# Patient Record
Sex: FEMALE | Race: WHITE | URBAN - METROPOLITAN AREA
[De-identification: names, ages, dates, MRNs, and addresses within clinical notes are randomized per-mention and may not be internally consistent; named-entity substitution may affect disease eponyms.]

---

## 2017-04-26 ENCOUNTER — OFFICE VISIT (OUTPATIENT)
Dept: FAMILY MEDICINE | Facility: CLINIC | Age: 27
End: 2017-04-26
Payer: COMMERCIAL

## 2017-04-26 VITALS
BODY MASS INDEX: 24.4 KG/M2 | DIASTOLIC BLOOD PRESSURE: 72 MMHG | SYSTOLIC BLOOD PRESSURE: 117 MMHG | OXYGEN SATURATION: 100 % | HEART RATE: 69 BPM | HEIGHT: 68 IN | WEIGHT: 161 LBS | TEMPERATURE: 98.3 F

## 2017-04-26 DIAGNOSIS — B00.1 RECURRENT COLD SORES: ICD-10-CM

## 2017-04-26 DIAGNOSIS — L71.0 PERIORAL DERMATITIS: Primary | ICD-10-CM

## 2017-04-26 DIAGNOSIS — Z86.39 HISTORY OF HYPOTHYROIDISM: ICD-10-CM

## 2017-04-26 PROCEDURE — 36415 COLL VENOUS BLD VENIPUNCTURE: CPT | Performed by: PHYSICIAN ASSISTANT

## 2017-04-26 PROCEDURE — 99213 OFFICE O/P EST LOW 20 MIN: CPT | Performed by: PHYSICIAN ASSISTANT

## 2017-04-26 PROCEDURE — 84439 ASSAY OF FREE THYROXINE: CPT | Performed by: PHYSICIAN ASSISTANT

## 2017-04-26 PROCEDURE — 84443 ASSAY THYROID STIM HORMONE: CPT | Performed by: PHYSICIAN ASSISTANT

## 2017-04-26 RX ORDER — LEVOTHYROXINE SODIUM 50 UG/1
50 TABLET ORAL DAILY
COMMUNITY
Start: 2016-03-10 | End: 2017-04-26

## 2017-04-26 RX ORDER — VALACYCLOVIR HYDROCHLORIDE 1 G/1
2000 TABLET, FILM COATED ORAL 2 TIMES DAILY
Qty: 12 TABLET | Refills: 1 | Status: SHIPPED | OUTPATIENT
Start: 2017-04-26 | End: 2017-09-20

## 2017-04-26 RX ORDER — MINOCYCLINE HYDROCHLORIDE 100 MG/1
100 CAPSULE ORAL 2 TIMES DAILY
Qty: 60 CAPSULE | Refills: 1 | Status: SHIPPED | OUTPATIENT
Start: 2017-04-26

## 2017-04-26 NOTE — LETTER
[facility]  [address]  [address]  [place]  [address]  Tel: [phone]    April 28, 2017    Karon Rai  [address]   [place] [address]        Dear MsJaye Rai,    Your TSH is just borderline at 4.81 so I think it would be fine to just monitor this for now.    Recheck 6-12 months or sooner if you become pregnant.    If you have any further questions or problems, please contact our office.      Sincerely,    Joi Reyna PA-C/amanda    Results for orders placed or performed in visit on 04/26/17   TSH with free T4 reflex   Result Value Ref Range    TSH 4.81 (H) 0.40 - 4.00 mU/L   T4 free   Result Value Ref Range    T4 Free 0.91 0.76 - 1.46 ng/dL           Enclosure: Lab Results

## 2017-04-26 NOTE — PROGRESS NOTES
"HPI: This is a 25 yo new pt to me here for f/u history of hypothyroidism  Pt stopped her synthroid about one year ago since only borderline when she had this diagnosed  Acne worse since getting her Mirena put in  She has tried benzoyl peroxide without relief  Denies any sxs of hypothyroidism such as wt gain   Has fatigue but busy with med school    No past medical history on file.  Past Surgical History:   Procedure Laterality Date     HEAD & NECK SURGERY      wisdom tooth extraction     TONSILLECTOMY       Social History   Substance Use Topics     Smoking status: Never Smoker     Smokeless tobacco: Never Used     Alcohol use 0.0 oz/week     0 Standard drinks or equivalent per week     Current Outpatient Prescriptions   Medication Sig Dispense Refill     minocycline (MINOCIN/DYNACIN) 100 MG capsule Take 1 capsule (100 mg) by mouth 2 times daily 60 capsule 1     valACYclovir (VALTREX) 1000 mg tablet Take 2 tablets (2,000 mg) by mouth 2 times daily 2 tabs twice daily for one day as needed 12 tablet 1     Levonorgestrel (MIRENA, 52 MG, IU)        [DISCONTINUED] valACYclovir (VALTREX) 1000 mg tablet Take 2 tablets (2,000 mg) by mouth 2 times daily 2 tabs twice daily for one day as needed (Patient not taking: Reported on 4/26/2017) 12 tablet 1     Allergies   Allergen Reactions     Penicillins Hives     FAMILY HISTORY NOTED AND REVIEWED    PHYSICAL EXAM:    /72 (BP Location: Right arm, Cuff Size: Adult Regular)  Pulse 69  Temp 98.3  F (36.8  C) (Oral)  Ht 5' 8\" (1.727 m)  Wt 161 lb (73 kg)  SpO2 100%  BMI 24.48 kg/m2    Patient appears non toxic  Psych: approp affect and mood.  Skin: papules note in the perioral area only.    Assessment and Plan:     (L71.0) Perioral dermatitis  (primary encounter diagnosis)  Comment:   Plan: minocycline (MINOCIN/DYNACIN) 100 MG capsule        Bid until clear, then qd and then see if able to taper off    (Z86.39) History of hypothyroidism  Comment: she hasn't been on " synthroid for a year.  Plan: TSH with free T4 reflex            (B00.1) Recurrent cold sores  Comment:   Plan: valACYclovir (VALTREX) 1000 mg tablet        Refilled; typically only gets a cold sore after dental work    Spent 15 minutes FTF with patient of which over 50% was spent discussing the coordination of care and management of their issues noted.        Joi Reyna PA-C

## 2017-04-26 NOTE — NURSING NOTE
"Chief Complaint   Patient presents with     Derm Problem     Acne     Thyroid Problem     f/u       Initial /72 (BP Location: Right arm, Cuff Size: Adult Regular)  Pulse 69  Temp 98.3  F (36.8  C) (Oral)  Ht 5' 8\" (1.727 m)  Wt 161 lb (73 kg)  SpO2 100%  BMI 24.48 kg/m2 Estimated body mass index is 24.48 kg/(m^2) as calculated from the following:    Height as of this encounter: 5' 8\" (1.727 m).    Weight as of this encounter: 161 lb (73 kg).  Medication Reconciliation: complete.    Lynda Herrmann CMA      "

## 2017-04-26 NOTE — MR AVS SNAPSHOT
"              After Visit Summary   4/26/2017    Karon Rai    MRN: [id number]           Patient Information     Date Of Birth          1990        Visit Information        Provider Department      4/26/2017 9:30 AM Joi Reyna PA-C [facility]        Today's Diagnoses     Perioral dermatitis    -  1    History of hypothyroidism           Follow-ups after your visit        Who to contact     If you have questions or need follow up information about today's clinic visit or your schedule please contact [facility] directly at [phone].  Normal or non-critical lab and imaging results will be communicated to you by MyChart, letter or phone within 4 business days after the clinic has received the results. If you do not hear from us within 7 days, please contact the clinic through MyChart or phone. If you have a critical or abnormal lab result, we will notify you by phone as soon as possible.  Submit refill requests through [employer] or call your pharmacy and they will forward the refill request to us. Please allow 3 business days for your refill to be completed.          Additional Information About Your Visit        Care EveryWhere ID     This is your Care EveryWhere ID. This could be used by other organizations to access your [facility] medical records  [id number]        Your Vitals Were     Pulse Temperature Height Pulse Oximetry BMI (Body Mass Index)       69 98.3  F (36.8  C) (Oral) 5' 8\" (1.727 m) 100% 24.48 kg/m2        Blood Pressure from Last 3 Encounters:   04/26/17 117/72   09/23/16 132/78   01/21/14 132/80    Weight from Last 3 Encounters:   04/26/17 161 lb (73 kg)   09/23/16 163 lb 11.2 oz (74.3 kg)   01/21/14 152 lb (68.9 kg)              We Performed the Following     TSH with free T4 reflex          Today's Medication Changes          These changes are accurate as of: 4/26/17  9:50 AM.  If you have any questions, ask your nurse or doctor.               Start taking " these medicines.        Dose/Directions    minocycline 100 MG capsule   Commonly known as:  MINOCIN/DYNACIN   Used for:  Perioral dermatitis   Started by:  Joi Reyna PA-C        Dose:  100 mg   Take 1 capsule (100 mg) by mouth 2 times daily   Quantity:  60 capsule   Refills:  1         Stop taking these medicines if you haven't already. Please contact your care team if you have questions.     levothyroxine 50 MCG tablet   Commonly known as:  SYNTHROID/LEVOTHROID   Stopped by:  Joi Reyna PA-C                Where to get your medicines      These medications were sent to [facility] [id number] [facility] - [place], [place] - [address]  [address] [address]     Phone:  [phone]     minocycline 100 MG capsule                Primary Care Provider    Physician No Ref-Primary       No address on file        Thank you!     Thank you for choosing [facility]  for your care. Our goal is always to provide you with excellent care. Hearing back from our patients is one way we can continue to improve our services. Please take a few minutes to complete the written survey that you may receive in the mail after your visit with us. Thank you!             Your Updated Medication List - Protect others around you: Learn how to safely use, store and throw away your medicines at www.disposemymeds.org.          This list is accurate as of: 4/26/17  9:50 AM.  Always use your most recent med list.                   Brand Name Dispense Instructions for use    minocycline 100 MG capsule    MINOCIN/DYNACIN    60 capsule    Take 1 capsule (100 mg) by mouth 2 times daily       MIRENA (52 MG) IU          valACYclovir 1000 mg tablet    VALTREX    12 tablet    Take 2 tablets (2,000 mg) by mouth 2 times daily 2 tabs twice daily for one day as needed

## 2017-04-27 LAB
T4 FREE SERPL-MCNC: 0.91 NG/DL (ref 0.76–1.46)
TSH SERPL DL<=0.005 MIU/L-ACNC: 4.81 MU/L (ref 0.4–4)

## 2017-04-28 NOTE — PROGRESS NOTES
Karon,    Your TSH is just borderline at 4.81 so I think it would be fine to just monitor this for now.    Recheck 6-12 months or sooner if you become pregnant.    Joi Reyna PA-C

## 2017-05-31 DIAGNOSIS — R30.0 DYSURIA: ICD-10-CM

## 2017-05-31 LAB
ALBUMIN UR-MCNC: NEGATIVE MG/DL
APPEARANCE UR: CLEAR
BACTERIA #/AREA URNS HPF: ABNORMAL /HPF
BILIRUB UR QL STRIP: NEGATIVE
COLOR UR AUTO: YELLOW
GLUCOSE UR STRIP-MCNC: NEGATIVE MG/DL
HGB UR QL STRIP: ABNORMAL
KETONES UR STRIP-MCNC: NEGATIVE MG/DL
LEUKOCYTE ESTERASE UR QL STRIP: NEGATIVE
NITRATE UR QL: NEGATIVE
NON-SQ EPI CELLS #/AREA URNS LPF: ABNORMAL /LPF
PH UR STRIP: 7 PH (ref 5–7)
RBC #/AREA URNS AUTO: ABNORMAL /HPF (ref 0–2)
SP GR UR STRIP: 1.01 (ref 1–1.03)
URN SPEC COLLECT METH UR: ABNORMAL
UROBILINOGEN UR STRIP-ACNC: 0.2 EU/DL (ref 0.2–1)
WBC #/AREA URNS AUTO: ABNORMAL /HPF (ref 0–2)

## 2017-05-31 PROCEDURE — 87086 URINE CULTURE/COLONY COUNT: CPT | Performed by: ADVANCED PRACTICE MIDWIFE

## 2017-05-31 PROCEDURE — 81001 URINALYSIS AUTO W/SCOPE: CPT | Performed by: ADVANCED PRACTICE MIDWIFE

## 2017-06-01 LAB
BACTERIA SPEC CULT: NORMAL
MICRO REPORT STATUS: NORMAL
SPECIMEN SOURCE: NORMAL

## 2017-09-06 ENCOUNTER — ALLIED HEALTH/NURSE VISIT (OUTPATIENT)
Dept: NURSING | Facility: CLINIC | Age: 27
End: 2017-09-06
Payer: COMMERCIAL

## 2017-09-06 DIAGNOSIS — Z11.1 VISIT FOR MANTOUX TEST: ICD-10-CM

## 2017-09-06 DIAGNOSIS — Z23 NEED FOR PROPHYLACTIC VACCINATION AND INOCULATION AGAINST INFLUENZA: Primary | ICD-10-CM

## 2017-09-06 PROCEDURE — 99207 ZZC NO CHARGE NURSE ONLY: CPT

## 2017-09-06 PROCEDURE — 86580 TB INTRADERMAL TEST: CPT

## 2017-09-06 PROCEDURE — 90686 IIV4 VACC NO PRSV 0.5 ML IM: CPT

## 2017-09-06 PROCEDURE — 90471 IMMUNIZATION ADMIN: CPT

## 2017-09-06 NOTE — MR AVS SNAPSHOT
"              After Visit Summary   2017    Karon Rai    MRN: [id number]           Patient Information     Date Of Birth          1990        Visit Information        Provider Department      2017 4:00 PM ANGIE CASEY MA/LPN [facility]        Today's Diagnoses     Need for prophylactic vaccination and inoculation against influenza    -  1    Visit for Mantoux test           Follow-ups after your visit        Who to contact     If you have questions or need follow up information about today's clinic visit or your schedule please contact [facility] directly at [phone].  Normal or non-critical lab and imaging results will be communicated to you by [employer], letter or phone within 4 business days after the clinic has received the results. If you do not hear from us within 7 days, please contact the clinic through [employer] or phone. If you have a critical or abnormal lab result, we will notify you by phone as soon as possible.  Submit refill requests through [employer] or call your pharmacy and they will forward the refill request to us. Please allow 3 business days for your refill to be completed.          Additional Information About Your Visit        MyChart Information     [employer] lets you send messages to your doctor, view your test results, renew your prescriptions, schedule appointments and more. To sign up, go to [email or web address] . Click on \"Log in\" on the left side of the screen, which will take you to the Welcome page. Then click on \"Sign up Now\" on the right side of the page.     You will be asked to enter the access code listed below, as well as some personal information. Please follow the directions to create your username and password.     Your access code is: [email or web address]  Expires: 2017  4:01 PM     Your access code will  in 90 days. If you need help or a new code, please call your [facility] or [phone].        Care EveryWhere ID     " This is your Care EveryWhere ID. This could be used by other organizations to access your [facility] medical records  [id number]         Blood Pressure from Last 3 Encounters:   04/26/17 117/72   09/23/16 132/78   01/21/14 132/80    Weight from Last 3 Encounters:   04/26/17 161 lb (73 kg)   09/23/16 163 lb 11.2 oz (74.3 kg)   01/21/14 152 lb (68.9 kg)              We Performed the Following     FLU VAC, SPLIT VIRUS IM > 3 YO (QUADRIVALENT) [80126]     TB INTRADERMAL TEST     Vaccine Administration, Initial [16902]        Primary Care Provider Office Phone # Fax #    Joi Reyna PA-C [phone] [address] [facility] [address]  [place] [address]        Equal Access to Services     AMMY QUINTANILLA : Hadii aad ku hadasho Soomaali, waaxda luqadaha, qaybta kaalmada adeegyada, vel ferrerin hayaan adeleo choudhury . So [facility] [phone].    ATENCIÓN: Si habla español, tiene a pierre disposición servicios gratuitos de asistencia lingüística. Sabi al [phone].    We comply with applicable federal civil rights laws and [place] laws. We do not discriminate on the basis of race, color, national origin, age, disability sex, sexual orientation or gender identity.            Thank you!     Thank you for choosing [facility]  for your care. Our goal is always to provide you with excellent care. Hearing back from our patients is one way we can continue to improve our services. Please take a few minutes to complete the written survey that you may receive in the mail after your visit with us. Thank you!             Your Updated Medication List - Protect others around you: Learn how to safely use, store and throw away your medicines at www.disposemymeds.org.          This list is accurate as of: 9/6/17  4:01 PM.  Always use your most recent med list.                   Brand Name Dispense Instructions for use Diagnosis    minocycline 100 MG capsule    MINOCIN/DYNACIN    60 capsule    Take 1 capsule  (100 mg) by mouth 2 times daily    Perioral dermatitis       MIRENA (52 MG) IU           nitroFURantoin (macrocrystal-monohydrate) 100 MG capsule    MACROBID    14 capsule    Take 1 capsule (100 mg) by mouth 2 times daily    Dysuria       valACYclovir 1000 mg tablet    VALTREX    12 tablet    Take 2 tablets (2,000 mg) by mouth 2 times daily 2 tabs twice daily for one day as needed    Recurrent cold sores

## 2017-09-06 NOTE — PROGRESS NOTES
Injectable Influenza Immunization Documentation    1.  Is the person to be vaccinated sick today?  No    2. Does the person to be vaccinated have an allergy to eggs or to a component of the vaccine?  No    3. Has the person to be vaccinated today ever had a serious reaction to influenza vaccine in the past?  No    4. Has the person to be vaccinated ever had Guillain-Poway syndrome?  No     Form completed by Lorena Allen CMA    The patient is asked the following questions today and these are her answers:    -Have you had a mantoux administered in the past 30 days?    No  -Have you had a previous positive Mantoux.  No  -Have you received BCG in the past.  No  -Have you had a live vaccine  (MMR, Varicella, OPV, Yellow Fever) in the last 6 weeks.  No  -Have you had and active  viral or bacterial infection in the past 6 weeks.  No  -Have you received corticosteroids or immunosuppressive agents in the past 6 weeks.  No  -Have you been diagnosed with HIV?  No  -Do you have a maglinancy?  No

## 2017-09-20 DIAGNOSIS — B00.1 RECURRENT COLD SORES: ICD-10-CM

## 2017-09-21 RX ORDER — VALACYCLOVIR HYDROCHLORIDE 1 G/1
TABLET, FILM COATED ORAL
Qty: 12 TABLET | Refills: 1 | Status: SHIPPED | OUTPATIENT
Start: 2017-09-21

## 2017-09-21 NOTE — TELEPHONE ENCOUNTER
Pending Prescriptions:                       Disp   Refills    valACYclovir (VALTREX) 1000 mg tablet [Ph*12 tab*1            Sig: TAKE 2 TABLETS BY MOUTH TWICE A DAY FOR 1 DAY AS           NEEDED         Last Written Prescription Date: 4/26/17  Last Fill Quantity: 12, # refills: 1  Last Office Visit with G, [facility] or [facility] prescribing provider: /26/17 Maribell        Creatinine   Date Value Ref Range Status   07/19/2013 0.79 0.52 - 1.04 mg/dL Final     Tenisha Murry RT(R)

## 2018-12-04 ENCOUNTER — HEALTH MAINTENANCE LETTER (OUTPATIENT)
Age: 28
End: 2018-12-04